# Patient Record
Sex: MALE | Race: WHITE | NOT HISPANIC OR LATINO
[De-identification: names, ages, dates, MRNs, and addresses within clinical notes are randomized per-mention and may not be internally consistent; named-entity substitution may affect disease eponyms.]

---

## 2021-03-12 PROBLEM — Z00.00 ENCOUNTER FOR PREVENTIVE HEALTH EXAMINATION: Status: ACTIVE | Noted: 2021-03-12

## 2021-03-24 ENCOUNTER — APPOINTMENT (OUTPATIENT)
Dept: UROLOGY | Facility: CLINIC | Age: 76
End: 2021-03-24
Payer: MEDICARE

## 2021-03-24 VITALS — HEIGHT: 66 IN | TEMPERATURE: 97.8 F | WEIGHT: 141 LBS | BODY MASS INDEX: 22.66 KG/M2

## 2021-03-24 PROCEDURE — 99214 OFFICE O/P EST MOD 30 MIN: CPT

## 2021-03-24 PROCEDURE — 99072 ADDL SUPL MATRL&STAF TM PHE: CPT

## 2021-03-25 NOTE — ASSESSMENT
[FreeTextEntry1] : 76 yo with elevated PSA and %free PSA \par \par I discussed the significance of elevated PSA levels with percent PSA elevation\par I explained the need for further evaluation\par \par - 4K\par - prostate MRI\par - renal and Bladder US\par - f/u with above

## 2021-03-25 NOTE — HISTORY OF PRESENT ILLNESS
[None] : no symptoms [FreeTextEntry1] : 76 yo with PSA elevation - persistent elevation\par \par PSA 12/2020\par 4.15 ng/ml with \par \par PSA \par 4.13 ng/ml with 30% free\par \par \par IPSS - 2\par IIEF - 20\par \par I discussed with the patient that a PSA with elevated % free - is not characteristic of malignancy but rather associated with BPH

## 2021-03-25 NOTE — LETTER BODY
[Dear  ___] : Dear  [unfilled], [Consult Letter:] : I had the pleasure of evaluating your patient, [unfilled]. [Please see my note below.] : Please see my note below. [Sincerely,] : Sincerely, [FreeTextEntry3] : Peg Wolfe MD, FACS\par

## 2021-04-01 ENCOUNTER — OUTPATIENT (OUTPATIENT)
Dept: OUTPATIENT SERVICES | Facility: HOSPITAL | Age: 76
LOS: 1 days | Discharge: HOME | End: 2021-04-01
Payer: MEDICARE

## 2021-04-01 ENCOUNTER — RESULT REVIEW (OUTPATIENT)
Age: 76
End: 2021-04-01

## 2021-04-01 DIAGNOSIS — R97.20 ELEVATED PROSTATE SPECIFIC ANTIGEN [PSA]: ICD-10-CM

## 2021-04-01 PROCEDURE — 76770 US EXAM ABDO BACK WALL COMP: CPT | Mod: 26

## 2021-05-03 ENCOUNTER — OUTPATIENT (OUTPATIENT)
Dept: OUTPATIENT SERVICES | Facility: HOSPITAL | Age: 76
LOS: 1 days | Discharge: HOME | End: 2021-05-03

## 2021-05-03 ENCOUNTER — LABORATORY RESULT (OUTPATIENT)
Age: 76
End: 2021-05-03

## 2021-05-03 DIAGNOSIS — Z11.59 ENCOUNTER FOR SCREENING FOR OTHER VIRAL DISEASES: ICD-10-CM

## 2021-05-06 ENCOUNTER — RESULT REVIEW (OUTPATIENT)
Age: 76
End: 2021-05-06

## 2021-05-06 ENCOUNTER — OUTPATIENT (OUTPATIENT)
Dept: OUTPATIENT SERVICES | Facility: HOSPITAL | Age: 76
LOS: 1 days | Discharge: HOME | End: 2021-05-06
Payer: MEDICARE

## 2021-05-06 DIAGNOSIS — R97.20 ELEVATED PROSTATE SPECIFIC ANTIGEN [PSA]: ICD-10-CM

## 2021-05-06 PROCEDURE — 72197 MRI PELVIS W/O & W/DYE: CPT | Mod: 26

## 2021-05-10 ENCOUNTER — APPOINTMENT (OUTPATIENT)
Dept: UROLOGY | Facility: CLINIC | Age: 76
End: 2021-05-10
Payer: MEDICARE

## 2021-05-10 VITALS — HEIGHT: 66 IN | WEIGHT: 141 LBS | BODY MASS INDEX: 22.66 KG/M2 | TEMPERATURE: 98.2 F

## 2021-05-10 LAB
4K SCORE CALCULATION: 23 %
FREE PSA: 1.47 NG/ML
PERCENT FREE PSA: 30 %
TOTAL PSA: 4.93 NG/ML

## 2021-05-10 PROCEDURE — 99214 OFFICE O/P EST MOD 30 MIN: CPT

## 2021-05-10 PROCEDURE — 99072 ADDL SUPL MATRL&STAF TM PHE: CPT

## 2021-05-11 NOTE — ASSESSMENT
[FreeTextEntry1] : 74 yo with elevated PSA\par elevated risk 4K but also with elevated percent free PSA\par I discussed the implication of an elevated PSA with elevated risk 4K \par that by age alone he harbors an increased risk of cancer\par I discussed the findings in detail\par \par I offered the options\par \par 1 do nothing and monitor PSA\par 2 perineal prostate biopsy\par \par I explained that a biopsy is not without risk\par I clearly outlined all the risks and benefits and all options\par all questions answered\par \par - prostate biopsy (perineal)

## 2021-05-11 NOTE — HISTORY OF PRESENT ILLNESS
[FreeTextEntry1] : 74 yo with PSA elevation - persistent elevation\par here to review his 4K and an MRI of the prostate / renal and bladder US\par \par 4K 5/10/2021\par 23%\par PSA 4.93 , 30% free\par \par MRI prostate 5/6/2021\par PIRADS 2 lesion\par \par renal and bladder US 4/1/2021\par prostate 48 grams\par no stones or masses\par prevoid 163 cc\par post 37 cc\par \par PSA 12/2020\par 4.15 ng/ml with \par \par PSA \par 4.13 ng/ml with 30% free\par \par \par IPSS - 2\par IIEF - 20\par \par I discussed with the patient that a PSA with elevated % free - is not characteristic of malignancy but rather associated with BPH

## 2021-05-14 ENCOUNTER — NON-APPOINTMENT (OUTPATIENT)
Age: 76
End: 2021-05-14

## 2021-05-14 RX ORDER — DIAZEPAM 10 MG/1
10 TABLET ORAL
Qty: 1 | Refills: 0 | Status: ACTIVE | COMMUNITY
Start: 2021-05-14 | End: 1900-01-01

## 2021-06-10 ENCOUNTER — APPOINTMENT (OUTPATIENT)
Dept: UROLOGY | Facility: CLINIC | Age: 76
End: 2021-06-10
Payer: MEDICARE

## 2021-06-10 ENCOUNTER — LABORATORY RESULT (OUTPATIENT)
Age: 76
End: 2021-06-10

## 2021-06-10 DIAGNOSIS — R97.20 ELEVATED PROSTATE, SPECIFIC ANTIGEN [PSA]: ICD-10-CM

## 2021-06-10 PROCEDURE — 76942 ECHO GUIDE FOR BIOPSY: CPT | Mod: 59

## 2021-06-10 PROCEDURE — 99072 ADDL SUPL MATRL&STAF TM PHE: CPT

## 2021-06-10 PROCEDURE — 93975 VASCULAR STUDY: CPT

## 2021-06-10 PROCEDURE — 55700: CPT

## 2021-06-10 PROCEDURE — 76872 US TRANSRECTAL: CPT

## 2021-06-24 ENCOUNTER — APPOINTMENT (OUTPATIENT)
Dept: UROLOGY | Facility: CLINIC | Age: 76
End: 2021-06-24
Payer: MEDICARE

## 2021-06-24 DIAGNOSIS — N41.9 INFLAMMATORY DISEASE OF PROSTATE, UNSPECIFIED: ICD-10-CM

## 2021-06-24 PROCEDURE — 99214 OFFICE O/P EST MOD 30 MIN: CPT

## 2021-06-24 PROCEDURE — 99072 ADDL SUPL MATRL&STAF TM PHE: CPT

## 2021-06-28 PROBLEM — N41.9 PROSTATITIS: Status: ACTIVE | Noted: 2021-06-28

## 2021-06-28 NOTE — ASSESSMENT
[FreeTextEntry1] : 75 yo with elevated PSA\par negative workup \par negative biopsy\par \par - f/u in 1 year with PSA\par - path and all imaging and blood work reviewed

## 2021-06-28 NOTE — PHYSICAL EXAM
[General Appearance - Well Developed] : well developed [Normal Appearance] : normal appearance [General Appearance - Well Nourished] : well nourished [Well Groomed] : well groomed [General Appearance - In No Acute Distress] : no acute distress [Abdomen Soft] : soft [Abdomen Tenderness] : non-tender [Costovertebral Angle Tenderness] : no ~M costovertebral angle tenderness [Edema] : no peripheral edema [] : no respiratory distress [Respiration, Rhythm And Depth] : normal respiratory rhythm and effort [Oriented To Time, Place, And Person] : oriented to person, place, and time [Exaggerated Use Of Accessory Muscles For Inspiration] : no accessory muscle use [Affect] : the affect was normal [Mood] : the mood was normal [Not Anxious] : not anxious [Normal Station and Gait] : the gait and station were normal for the patient's age [No Focal Deficits] : no focal deficits [No Palpable Adenopathy] : no palpable adenopathy

## 2021-06-28 NOTE — LETTER BODY
[Dear  ___] : Dear  [unfilled], [Consult Letter:] : I had the pleasure of evaluating your patient, [unfilled]. [Sincerely,] : Sincerely, [Please see my note below.] : Please see my note below. [FreeTextEntry3] : Peg Wolfe MD, FACS\par

## 2021-06-28 NOTE — HISTORY OF PRESENT ILLNESS
[FreeTextEntry1] : 77 yo with PSA elevation - persistent elevation\par here to review his 4K and an MRI of the prostate / renal and bladder US\par \par 4K 5/10/2021\par 23%\par PSA 4.93 , 30% free\par \par MRI prostate 5/6/2021\par PIRADS 2 lesion\par \par renal and bladder US 4/1/2021\par prostate 48 grams\par no stones or masses\par prevoid 163 cc\par post 37 cc\par \par PSA 12/2020\par 4.15 ng/ml with \par \par PSA \par 4.13 ng/ml with 30% free\par \par s/p prostate biopsy 6/10/21\par mild chronic prostatitis \par no cancer identified\par \par \par IPSS - 2\par IIEF - 20\par \par

## 2022-09-29 ENCOUNTER — APPOINTMENT (OUTPATIENT)
Dept: UROLOGY | Facility: CLINIC | Age: 77
End: 2022-09-29

## 2022-09-29 DIAGNOSIS — N13.8 BENIGN PROSTATIC HYPERPLASIA WITH LOWER URINARY TRACT SYMPMS: ICD-10-CM

## 2022-09-29 DIAGNOSIS — N40.1 BENIGN PROSTATIC HYPERPLASIA WITH LOWER URINARY TRACT SYMPMS: ICD-10-CM

## 2022-09-29 PROCEDURE — 99214 OFFICE O/P EST MOD 30 MIN: CPT

## 2022-09-30 ENCOUNTER — NON-APPOINTMENT (OUTPATIENT)
Age: 77
End: 2022-09-30

## 2022-09-30 LAB
PSA FREE FLD-MCNC: 22 %
PSA FREE SERPL-MCNC: 1.09 NG/ML
PSA SERPL-MCNC: 4.9 NG/ML

## 2022-10-02 PROBLEM — N40.1 BENIGN LOCALIZED HYPERPLASIA OF PROSTATE WITH URINARY OBSTRUCTION: Status: ACTIVE | Noted: 2022-10-02

## 2022-10-02 NOTE — HISTORY OF PRESENT ILLNESS
[FreeTextEntry1] : Jose is a 77-year-old male with history of elevated PSA.  He had a 4K prostate score in May 2021 which was 23% PSA was 4.93 ng/mL.  He had an MRI of the prostate in May 2021 which revealed a PI-RADS 2 lesion and a 56 mL prostate.  PSA in December 2020 was 4.15.  Most recent PSA in December 2021 is 4.13 ng/mL.\par \par Also history of prostate biopsy on June 2021.  Mild chronic prostatitis and no cancer identified.\par \par Patient presents to office today reporting that he feels well.  He denies any urinary issues.  He denies dysuria and gross hematuria.  He denies erectile dysfunction.\par \par No recent PSAs available to review.

## 2022-10-02 NOTE — ADDENDUM
[FreeTextEntry1] : Documented by MEGGAN Bowens acting as a scribe for Dr. Peg Wolfe \par \par All medical record entries made by the Scribe were at my, Dr. Wolfe direction and\par personally dictated by me.  I have reviewed the chart and agree that the record\par accurately reflects my personal performance of the history, physical exam, procedure and imaging.  \par  \par \par

## 2022-10-02 NOTE — ASSESSMENT
[FreeTextEntry1] : 77-year-old male with BPH and elevated PSA.\par Negative biopsy last year.\par \par Most recent PSA is from December 2021.\par \par Plan\par -PSA today.  If PSA is stable we will repeat PSA in 6 months to closely trend.\par -Follow-up 6 months to review.\par -If PSA today is elevated above baseline, will obtain repeat MRI of prostate.

## 2022-10-03 ENCOUNTER — NON-APPOINTMENT (OUTPATIENT)
Age: 77
End: 2022-10-03

## 2023-04-03 ENCOUNTER — LABORATORY RESULT (OUTPATIENT)
Age: 78
End: 2023-04-03

## 2023-04-03 ENCOUNTER — APPOINTMENT (OUTPATIENT)
Dept: UROLOGY | Facility: CLINIC | Age: 78
End: 2023-04-03
Payer: MEDICARE

## 2023-04-03 VITALS
WEIGHT: 141 LBS | OXYGEN SATURATION: 98 % | BODY MASS INDEX: 22.66 KG/M2 | SYSTOLIC BLOOD PRESSURE: 148 MMHG | DIASTOLIC BLOOD PRESSURE: 84 MMHG | TEMPERATURE: 98.5 F | RESPIRATION RATE: 18 BRPM | HEIGHT: 66 IN | HEART RATE: 101 BPM

## 2023-04-03 DIAGNOSIS — R97.20 ELEVATED PROSTATE, SPECIFIC ANTIGEN [PSA]: ICD-10-CM

## 2023-04-03 PROCEDURE — 99214 OFFICE O/P EST MOD 30 MIN: CPT

## 2023-04-04 LAB
APPEARANCE: CLEAR
BILIRUBIN URINE: NEGATIVE
BLOOD URINE: NEGATIVE
COLOR: YELLOW
GLUCOSE QUALITATIVE U: NEGATIVE
KETONES URINE: NEGATIVE
LEUKOCYTE ESTERASE URINE: NEGATIVE
NITRITE URINE: NEGATIVE
PH URINE: 6
PROTEIN URINE: ABNORMAL
PSA FREE FLD-MCNC: 26 %
PSA FREE SERPL-MCNC: 1.08 NG/ML
PSA SERPL-MCNC: 4.16 NG/ML
SPECIFIC GRAVITY URINE: 1.02
UROBILINOGEN URINE: NORMAL

## 2023-04-05 PROBLEM — R97.20 ELEVATED PROSTATE SPECIFIC ANTIGEN (PSA): Status: ACTIVE | Noted: 2021-03-25

## 2023-04-05 LAB — BACTERIA UR CULT: NORMAL

## 2023-04-05 NOTE — ASSESSMENT
[FreeTextEntry1] : 77-year-old male with BPH and elevated PSA.\par Negative biopsy last year.\par \par Most recent PSA is from December 2021.\par \par Plan\par -PSA today. reviewed\par - f/u in 6 months\par \par patient offers no new complaints\par feels well\par

## 2023-04-05 NOTE — HISTORY OF PRESENT ILLNESS
[FreeTextEntry1] :  77-year-old male with history of elevated PSA.  \par \par He had a 4K prostate score in May 2021 which was 23% PSA was 4.93 ng/mL. \par \par  He had an MRI of the prostate in May 2021 which revealed a PI-RADS 2 lesion and a 56 mL prostate. \par \par  PSA in December 2020 was 4.15.  Most recent PSA in December 2021 is 4.13 ng/mL.\par \par prostate biopsy on June 2021.  Mild chronic prostatitis and no cancer identified.\par \par PSA 4.90 ng/ml 9/2022\par PSA 4.16 ng/ml 26% free 4/2023\par \par offers no new complaints [None] : no symptoms

## 2023-04-10 ENCOUNTER — NON-APPOINTMENT (OUTPATIENT)
Age: 78
End: 2023-04-10

## 2023-10-09 ENCOUNTER — APPOINTMENT (OUTPATIENT)
Dept: UROLOGY | Facility: CLINIC | Age: 78
End: 2023-10-09

## 2024-05-02 ENCOUNTER — APPOINTMENT (OUTPATIENT)
Dept: UROLOGY | Facility: CLINIC | Age: 79
End: 2024-05-02

## 2024-07-01 ENCOUNTER — APPOINTMENT (OUTPATIENT)
Dept: UROLOGY | Facility: CLINIC | Age: 79
End: 2024-07-01
Payer: MEDICARE

## 2024-07-01 VITALS
HEIGHT: 66 IN | SYSTOLIC BLOOD PRESSURE: 144 MMHG | BODY MASS INDEX: 22.66 KG/M2 | TEMPERATURE: 98 F | WEIGHT: 141 LBS | DIASTOLIC BLOOD PRESSURE: 81 MMHG

## 2024-07-01 DIAGNOSIS — R97.20 ELEVATED PROSTATE, SPECIFIC ANTIGEN [PSA]: ICD-10-CM

## 2024-07-01 DIAGNOSIS — N13.8 BENIGN PROSTATIC HYPERPLASIA WITH LOWER URINARY TRACT SYMPMS: ICD-10-CM

## 2024-07-01 DIAGNOSIS — N40.1 BENIGN PROSTATIC HYPERPLASIA WITH LOWER URINARY TRACT SYMPMS: ICD-10-CM

## 2024-07-01 PROCEDURE — 99214 OFFICE O/P EST MOD 30 MIN: CPT

## 2024-07-03 LAB — PSA SERPL-MCNC: 5.16 NG/ML

## 2024-07-05 ENCOUNTER — TRANSCRIPTION ENCOUNTER (OUTPATIENT)
Age: 79
End: 2024-07-05

## 2025-07-01 ENCOUNTER — NON-APPOINTMENT (OUTPATIENT)
Age: 80
End: 2025-07-01

## 2025-07-03 ENCOUNTER — APPOINTMENT (OUTPATIENT)
Dept: UROLOGY | Facility: CLINIC | Age: 80
End: 2025-07-03
Payer: MEDICARE

## 2025-07-03 VITALS
HEART RATE: 93 BPM | OXYGEN SATURATION: 96 % | WEIGHT: 141 LBS | BODY MASS INDEX: 22.66 KG/M2 | HEIGHT: 66 IN | SYSTOLIC BLOOD PRESSURE: 142 MMHG | DIASTOLIC BLOOD PRESSURE: 85 MMHG

## 2025-07-03 PROCEDURE — 99214 OFFICE O/P EST MOD 30 MIN: CPT
